# Patient Record
Sex: MALE | Race: WHITE | NOT HISPANIC OR LATINO | URBAN - METROPOLITAN AREA
[De-identification: names, ages, dates, MRNs, and addresses within clinical notes are randomized per-mention and may not be internally consistent; named-entity substitution may affect disease eponyms.]

---

## 2021-08-10 ENCOUNTER — OFFICE (OUTPATIENT)
Dept: URBAN - METROPOLITAN AREA CLINIC 102 | Facility: CLINIC | Age: 71
End: 2021-08-10

## 2021-08-10 ENCOUNTER — OFFICE (OUTPATIENT)
Dept: URBAN - METROPOLITAN AREA CLINIC 102 | Facility: CLINIC | Age: 71
End: 2021-08-10
Payer: MEDICARE

## 2021-08-10 VITALS
HEART RATE: 55 BPM | WEIGHT: 170 LBS | HEIGHT: 70 IN | SYSTOLIC BLOOD PRESSURE: 139 MMHG | DIASTOLIC BLOOD PRESSURE: 76 MMHG | TEMPERATURE: 97.9 F

## 2021-08-10 DIAGNOSIS — R19.5 OTHER FECAL ABNORMALITIES: ICD-10-CM

## 2021-08-10 DIAGNOSIS — E11.9 TYPE 2 DIABETES MELLITUS WITHOUT COMPLICATIONS: ICD-10-CM

## 2021-08-10 DIAGNOSIS — I48.91 UNSPECIFIED ATRIAL FIBRILLATION: ICD-10-CM

## 2021-08-10 PROCEDURE — 99204 OFFICE O/P NEW MOD 45 MIN: CPT | Performed by: PHYSICIAN ASSISTANT

## 2021-08-10 PROCEDURE — 00031: CPT | Performed by: INTERNAL MEDICINE

## 2021-08-10 NOTE — SERVICEHPINOTES
JIMY CHANG   is a   71   year old white male who is being seen in consultation at the request of   JOSE A RAMIREZ   for + cologuard. He has daily BMs, BSS type 4 predominantly No blood in stool or BRBPR. he has DMT2 well controlled given 04/2021 hg A1c 7.5%. Last colonoscopy over 6 yrs ago for screening CRC that was normal per patient report. He is followed by Dr Donato due to A-fib on Eliquis. Denies chest pain, dyspnea, n/v, abdominal pain, change in BMs, weight loss. No other complaints.

## 2021-10-21 ENCOUNTER — ON CAMPUS - OUTPATIENT (OUTPATIENT)
Dept: URBAN - METROPOLITAN AREA HOSPITAL 16 | Facility: HOSPITAL | Age: 71
End: 2021-10-21
Payer: MEDICARE

## 2021-10-21 DIAGNOSIS — R19.5 OTHER FECAL ABNORMALITIES: ICD-10-CM

## 2021-10-21 DIAGNOSIS — D12.5 BENIGN NEOPLASM OF SIGMOID COLON: ICD-10-CM

## 2021-10-21 DIAGNOSIS — K63.5 POLYP OF COLON: ICD-10-CM

## 2021-10-21 PROCEDURE — 45380 COLONOSCOPY AND BIOPSY: CPT | Performed by: INTERNAL MEDICINE
